# Patient Record
Sex: MALE | Race: WHITE | NOT HISPANIC OR LATINO | Employment: FULL TIME | URBAN - METROPOLITAN AREA
[De-identification: names, ages, dates, MRNs, and addresses within clinical notes are randomized per-mention and may not be internally consistent; named-entity substitution may affect disease eponyms.]

---

## 2021-04-08 DIAGNOSIS — Z23 ENCOUNTER FOR IMMUNIZATION: ICD-10-CM

## 2022-04-06 ENCOUNTER — OFFICE VISIT (OUTPATIENT)
Dept: URGENT CARE | Facility: CLINIC | Age: 52
End: 2022-04-06
Payer: COMMERCIAL

## 2022-04-06 ENCOUNTER — APPOINTMENT (OUTPATIENT)
Dept: RADIOLOGY | Facility: CLINIC | Age: 52
End: 2022-04-06
Payer: COMMERCIAL

## 2022-04-06 VITALS
OXYGEN SATURATION: 99 % | WEIGHT: 260 LBS | TEMPERATURE: 97.4 F | RESPIRATION RATE: 16 BRPM | BODY MASS INDEX: 37.31 KG/M2 | HEART RATE: 75 BPM

## 2022-04-06 DIAGNOSIS — S20.211A RIB CONTUSION, RIGHT, INITIAL ENCOUNTER: ICD-10-CM

## 2022-04-06 DIAGNOSIS — S23.41XA SPRAIN OF COSTAL CARTILAGE, INITIAL ENCOUNTER: Primary | ICD-10-CM

## 2022-04-06 PROCEDURE — 71101 X-RAY EXAM UNILAT RIBS/CHEST: CPT

## 2022-04-06 PROCEDURE — G0382 LEV 3 HOSP TYPE B ED VISIT: HCPCS | Performed by: PHYSICIAN ASSISTANT

## 2022-04-06 RX ORDER — METHOCARBAMOL 500 MG/1
500 TABLET, FILM COATED ORAL 3 TIMES DAILY
Qty: 15 TABLET | Refills: 0 | Status: SHIPPED | OUTPATIENT
Start: 2022-04-06

## 2022-04-06 NOTE — PATIENT INSTRUCTIONS
Robaxin as directed-caution as it may make you tired  Ibuprofen and Tylenol   Ice and heat  Deep breathing exercises to avoid pneumonia   If no improvement follow-up with PCP   Follow-up sooner if anything changes or worsens

## 2022-04-06 NOTE — PROGRESS NOTES
NAME: Marion Edmond is a 45 y o  male  : 1970    MRN: [id number]      Assessment and Plan   Sprain of costal cartilage, initial encounter [Q56 [id number]]  1  Sprain of costal cartilage, initial encounter  XR ribs right w pa chest min 3 views    methocarbamol (ROBAXIN) 500 mg tablet      x-ray right ribs:  No acute fractures visualized, will follow-up on radiology read    Discussed with patient likely a sprain given he had no direct trauma to the area  Discussed trial of Robaxin-caution as it may make you tired  Ibuprofen, Tylenol along with ice and heat  Discussed importance of deep breathing exercises to avoid pneumonia  Follow-up with PCP if no improvement, sooner if anything changes or worsens  He acknowledges      Patient Instructions   Patient Instructions   Robaxin as directed-caution as it may make you tired  Ibuprofen and Tylenol   Ice and heat  Deep breathing exercises to avoid pneumonia   If no improvement follow-up with PCP   Follow-up sooner if anything changes or worsens    Proceed to ER if symptoms worsen  Chief Complaint     Chief Complaint   Patient presents with    Chest Pain     Rib pain post fall down 2 steps on Saturday  Pain continues with movement, deep breath, laughing, coughing  Denies head injury  Denies SOB  History of Present Illness   Patient with history of diabetes presents complaining of right rib pain x5 days  Reports fell down 2 steps on Saturday to his left side on his outstretched left arm  Reports at that time felt something pull in the right side of his anterior ribs  Denies head injury or loss of consciousness  Reports he has been having pain to the area since  Reports it has improved a little bit but wanted to get checked out  Reports at rest he has no pain  Pain with moving, twisting, coughing, laughing and deep breathing  He denies any fever, chills, cough, chest pain or shortness of breath    Tried taking over-the-counter medication with some relief  Denies any abdominal pain, nausea, vomiting, diarrhea or blood in the stool      Review of Systems   Review of Systems   Constitutional: Negative for chills and fever  Respiratory: Negative for cough, chest tightness and shortness of breath  Cardiovascular: Negative for chest pain and palpitations  Gastrointestinal: Negative for diarrhea, nausea and vomiting  Musculoskeletal:        Right rib pain   Neurological: Negative for weakness and numbness  Current Medications       Current Outpatient Medications:     aspirin (ECOTRIN) 325 mg EC tablet, Take 1 tablet by mouth daily for 30 days, Disp: 30 tablet, Rfl: 0    atorvastatin (LIPITOR) 20 mg tablet, Take 1 tablet by mouth daily with dinner for 30 days, Disp: 30 tablet, Rfl: 0    insulin glargine (LANTUS) 100 units/mL subcutaneous injection, Inject 25 Units under the skin daily at bedtime for 30 days, Disp: 750 Units, Rfl: 0    metFORMIN (GLUCOPHAGE) 500 mg tablet, Take 1 tablet by mouth 2 (two) times a day with meals for 30 days, Disp: 60 tablet, Rfl: 0    methocarbamol (ROBAXIN) 500 mg tablet, Take 1 tablet (500 mg total) by mouth 3 (three) times a day, Disp: 15 tablet, Rfl: 0    Current Allergies     Allergies as of 04/06/2022    (No Known Allergies)              Past Medical History:   Diagnosis Date    Diabetes mellitus ([facility] [place] [address] )        Past Surgical History:   Procedure Laterality Date    VASECTOMY         History reviewed  No pertinent family history  Medications have been verified  The following portions of the patient's history were reviewed and updated as appropriate: allergies, current medications, past family history, past medical history, past social history, past surgical history and problem list     Objective   Pulse 75   Temp (!) 97 4 °F (36 3 °C)   Resp 16   Wt 118 kg (260 lb)   SpO2 99%   BMI 37 31 kg/m²      Physical Exam     Physical Exam  Vitals and nursing note reviewed     Constitutional:       General: He is not in acute distress  Appearance: He is well-developed  He is not ill-appearing, toxic-appearing or diaphoretic  Cardiovascular:      Rate and Rhythm: Normal rate and regular rhythm  Heart sounds: Normal heart sounds  No murmur heard  Pulmonary:      Effort: Pulmonary effort is normal  No tachypnea, accessory muscle usage or respiratory distress  Breath sounds: Normal breath sounds  No stridor  No decreased breath sounds, wheezing, rhonchi or rales  Musculoskeletal:        Arms:       Comments: No erythema, edema, ecchymosis or abrasions at the right ribs  Tender to palpation along the 4th through 6th ribs from the midclavicular line to the axillary line without any deformity or step-offs  Full and equal chest expansion on deep inspiration  Neurological:      Mental Status: He is alert